# Patient Record
Sex: MALE | Race: WHITE | NOT HISPANIC OR LATINO
[De-identification: names, ages, dates, MRNs, and addresses within clinical notes are randomized per-mention and may not be internally consistent; named-entity substitution may affect disease eponyms.]

---

## 2023-11-07 PROBLEM — Z00.00 ENCOUNTER FOR PREVENTIVE HEALTH EXAMINATION: Status: ACTIVE | Noted: 2023-11-07

## 2023-11-07 PROCEDURE — 36415 COLL VENOUS BLD VENIPUNCTURE: CPT

## 2024-02-23 ENCOUNTER — APPOINTMENT (OUTPATIENT)
Dept: UROLOGY | Facility: CLINIC | Age: 34
End: 2024-02-23
Payer: COMMERCIAL

## 2024-02-23 VITALS
SYSTOLIC BLOOD PRESSURE: 127 MMHG | HEART RATE: 72 BPM | BODY MASS INDEX: 23.54 KG/M2 | WEIGHT: 150 LBS | TEMPERATURE: 98.7 F | HEIGHT: 67 IN | DIASTOLIC BLOOD PRESSURE: 83 MMHG

## 2024-02-23 DIAGNOSIS — Z78.9 OTHER SPECIFIED HEALTH STATUS: ICD-10-CM

## 2024-02-23 DIAGNOSIS — N50.819 TESTICULAR PAIN, UNSPECIFIED: ICD-10-CM

## 2024-02-23 DIAGNOSIS — N46.9 MALE INFERTILITY, UNSPECIFIED: ICD-10-CM

## 2024-02-23 DIAGNOSIS — Z83.3 FAMILY HISTORY OF DIABETES MELLITUS: ICD-10-CM

## 2024-02-23 PROCEDURE — 99204 OFFICE O/P NEW MOD 45 MIN: CPT

## 2024-02-23 NOTE — HISTORY OF PRESENT ILLNESS
[FreeTextEntry1] : Name HUGH LANDON  MRN [id number]   May 13 1990  Contact Number: [phone] ----------------------------------------------------------------------------------------------------------------------------------------- Date of First Visit: 24 Referring Provider/PCP: Karlee DEY ([facility]) f: [phone] -----------------------------------------------------------------------------------------------------------------------------------------  CC: scrotal pain/cyst, infertility evaluation  History of Present Illness: HUGH LANDON is a 33 year old male who presents for evaluation of right-sided testicular pain. Pain began 1-2 months ago and is intermittent in frequency. Describes the pain as dull in quality without radiation. Rated at 2/10 at its worst. Currently has 1/10 pain. No associated LUTS or gross hematuria. Denies any aggravating or ameliorating factors. Feels cyst on left.  No previous urinary tract infections.  Denies prior sexually transmitted infections.  Not concerned for any STIs. Denies prior scrotal trauma, abdominal surgery, scrotal surgery, or inguinal surgery. Had US 2024: No intratesticular masses are noted. Subcentimeter left-sided cyst in the head of the left epididymis and scrotal basil.    Patient reports he and his partner have been trying to conceive for past 5-6 months. Wife has been tracking ovulation. Wife is 31. She is seeing ob-gyn and will be discussed at next appt. No hypospadias. No history of undescended testicle. No known genetic abnormalities. No DM, HTN, HLD.  Lifestyle factors: opioid use, very rare marijuana especially over past 6 months, social alcohol, no systemic steroids - just topical creams for derm.  Patient reports normal libido, no issues with erections.  Testicular US [facility] 24: FINDINGS:   RIGHT SIDE: Testicle: Normal size and morphology with homogeneous echotexture and normal vascularity (arterial and venous) measuring 4.6 x 2.3 x 3.3 cm, 17.7 cc. Epididymis: Normal size, contour, morphology and vascularity. Hydrocele: None.  Varicocele: None.  LEFT SIDE: Testicle: Normal size and morphology with homogeneous echotexture and normal vascularity (arterial and venous) measuring 4.3 x 2.5 x 2.9 cm, 16.5 cc. A 0.5 x 0.2 x 0.4 cm scrotal basil is noted. Epididymis: A 0.2 x 0.2 x 0.2 cm cyst is noted in the head. Hydrocele: None.  Varicocele: None.  IMPRESSION:   No intratesticular masses are noted. Subcentimeter left-sided cyst in the head of the left epididymis and scrotal basil.   Spoke with Dr. Hunt - confirmed no suspicious findings and no fu indicated for scrotal basil  Abdominal US 8/3/23: Kidneys: Normal in size, morphology and cortical echotexture. There is no suspicious renal lesion. No hydronephrosis, shadowing calculi or perinephric fluid. Right Kidney: 9.7 cm in length. Left Kidney:   11.0 cm in length.  IPSS 1 QOL 0 ADARSH 25  PMH: none PSH: hand surgery Family History: grandmother breast cancer, still alive, mother skin cancers Social: , trying to conceive - no children, , never smoker, social alcohol, rare marijuana Meds: topical skin creams Allergies: sulfa ROS: no fevers, chills, flank pain

## 2024-02-23 NOTE — LETTER BODY
[Dear  ___] : Dear ~KATHY, [Please see my note below.] : Please see my note below. [Courtesy Letter:] : I had the pleasure of seeing your patient, [unfilled], in my office today. [Consult Closing:] : Thank you very much for allowing me to participate in the care of this patient.  If you have any questions, please do not hesitate to contact me. [FreeTextEntry3] : Denice Alejo MD Director of Robotic Education [facility]   [email or web address] [phone] [Sincerely,] : Sincerely,

## 2024-02-23 NOTE — PHYSICAL EXAM
[Urethral Meatus] : meatus normal [Testes Mass (___cm)] : there were no testicular masses [Testes Tenderness] : no tenderness of the testes [de-identified] : + left epi cyst and tiny scrotal basil mobile

## 2024-02-24 LAB
APPEARANCE: CLEAR
BACTERIA: NEGATIVE /HPF
BILIRUBIN URINE: NEGATIVE
BLOOD URINE: NEGATIVE
CAST: 0 /LPF
COLOR: YELLOW
EPITHELIAL CELLS: 0 /HPF
GLUCOSE QUALITATIVE U: NEGATIVE MG/DL
KETONES URINE: NEGATIVE MG/DL
LEUKOCYTE ESTERASE URINE: NEGATIVE
MICROSCOPIC-UA: NORMAL
NITRITE URINE: NEGATIVE
PH URINE: 6.5
PROTEIN URINE: NEGATIVE MG/DL
RED BLOOD CELLS URINE: 0 /HPF
SPECIFIC GRAVITY URINE: 1.03
UROBILINOGEN URINE: 0.2 MG/DL
WHITE BLOOD CELLS URINE: 0 /HPF

## 2024-02-25 LAB — BACTERIA UR CULT: NORMAL

## 2024-03-19 ENCOUNTER — APPOINTMENT (OUTPATIENT)
Dept: HUMAN REPRODUCTION | Facility: CLINIC | Age: 34
End: 2024-03-19
Payer: COMMERCIAL

## 2024-03-19 PROCEDURE — 36415 COLL VENOUS BLD VENIPUNCTURE: CPT

## 2024-03-19 PROCEDURE — 89322 SEMEN ANAL STRICT CRITERIA: CPT

## 2024-03-28 ENCOUNTER — TRANSCRIPTION ENCOUNTER (OUTPATIENT)
Age: 34
End: 2024-03-28

## 2024-03-29 ENCOUNTER — APPOINTMENT (OUTPATIENT)
Dept: UROLOGY | Facility: CLINIC | Age: 34
End: 2024-03-29

## 2025-03-20 NOTE — ASSESSMENT
[FreeTextEntry1] : HUGH LANDON is a 33 year old male with right -sided orchalgia and trying to conceive past 5-6 months.  Discussed etiology of scrotal pain, and acute versus chronic. Low suspicion for acute causes such as testicular torsion or epididymal orchitis. With regards to chronic pain, discussed possible etiologies including but not limited to varicocele, hydrocele, spermatocele, scrotal mass, and idiopathic. Will proceed with work-up with UA, urine culture, and scrotal US. Patient has no cocnerns for STIs. US No intratesticular masses are noted. Subcentimeter left-sided cyst in the head of the left epididymis and scrotal basil.  I explained that many, if not most, cases represent functional pain as they lack a known anatomic or pathologic cause. In the absence of a clear cause, I explained there is no magic bullet of treatment for the pain. Rather, our focus is on supportive measures, including symptom control and modifiable factors that can lessen pain and reduce its frequency.    With regards to infertility - discussed natural history of infertility. Most couples achieve a pregnancy in the first 3 to 6 months of attempted conception, with 75% of couples achieving a pregnancy after 6 months of trying. After one year of attempting to conceive, approximately 85% of couples will have achieved a pregnancy. After two full years of attempting to conceive, this statistic is increased to over 90% of couples. In women under 35 years of age, infertility is considered present after 12 months of attempting to conceive. This duration is shortened in women over the age of 35 years to 6 months. Infertility may be due to male factors, female factors or a combination of male and female factors. a workup of both partners is always required. Discussed that maternal age is the strongest predictor of fertility outcome in couples undergoing therapy. Discussed next step in work-up is semen analysis. Counseled that there are men who have abnormal semen parameters, yet they have contributed to a prior successful pregnancy through natural conception. Hormonal evaluation not indicated at this time. Indications include: impaired libido, erectile dysfunction, oligozoospermia or azoospermia, atrophic testes, or evidence of hormonal abnormality on physical evaluation.  In order to further evaluate, recommend to semen-analysis.  Plan:  -UA, UCx  -Supportive care including NSAIDs as needed, ice packs, supportive underwear, avoid activities that exacerbate/trigger pain  -semen analysis -fu after SA telemed  
N/A

## 2025-04-08 ENCOUNTER — APPOINTMENT (OUTPATIENT)
Dept: UROLOGY | Facility: CLINIC | Age: 35
End: 2025-04-08
Payer: COMMERCIAL

## 2025-04-08 VITALS
WEIGHT: 149 LBS | HEIGHT: 67 IN | BODY MASS INDEX: 23.39 KG/M2 | DIASTOLIC BLOOD PRESSURE: 89 MMHG | TEMPERATURE: 98.7 F | SYSTOLIC BLOOD PRESSURE: 131 MMHG | HEART RATE: 108 BPM

## 2025-04-08 DIAGNOSIS — N50.819 TESTICULAR PAIN, UNSPECIFIED: ICD-10-CM

## 2025-04-08 PROCEDURE — 76870 US EXAM SCROTUM: CPT

## 2025-04-08 PROCEDURE — 99213 OFFICE O/P EST LOW 20 MIN: CPT

## 2025-04-09 LAB
APPEARANCE: ABNORMAL
BACTERIA: NEGATIVE /HPF
BILIRUBIN URINE: NEGATIVE
BLOOD URINE: NEGATIVE
CAST: 0 /LPF
COLOR: YELLOW
EPITHELIAL CELLS: 0 /HPF
GLUCOSE QUALITATIVE U: NEGATIVE MG/DL
KETONES URINE: NEGATIVE MG/DL
LEUKOCYTE ESTERASE URINE: NEGATIVE
MICROSCOPIC-UA: NORMAL
NITRITE URINE: NEGATIVE
PH URINE: 5.5
PROTEIN URINE: NEGATIVE MG/DL
RED BLOOD CELLS URINE: 1 /HPF
SPECIFIC GRAVITY URINE: 1.02
UROBILINOGEN URINE: 0.2 MG/DL
WHITE BLOOD CELLS URINE: 0 /HPF

## 2025-04-10 LAB — BACTERIA UR CULT: NORMAL
